# Patient Record
Sex: FEMALE | Race: WHITE | NOT HISPANIC OR LATINO | Employment: OTHER | URBAN - METROPOLITAN AREA
[De-identification: names, ages, dates, MRNs, and addresses within clinical notes are randomized per-mention and may not be internally consistent; named-entity substitution may affect disease eponyms.]

---

## 2017-11-03 NOTE — PATIENT DISCUSSION
Headaches: The trigger initially causes headaches that then become more frequent, eventually occurring almost every day, and difficult to treat. Strategies for treatment and preventing progression include appropriate pain control with medications, avoiding overuse of ibuprofen and acetaminophen, reducing the frequency of headaches with behavioral techniques and preventive medications, and encouraging the adoption of a healthy lifestyle.

## 2017-11-03 NOTE — PATIENT DISCUSSION
HEADACHES WITH NORMAL OPHTHALMOLOGICAL EXAM, OU: RECOMMEND PATIENT FOLLOW UP WITH PRIMARY CARE PHYSICIAN AND NEUROLOGIST NEXT WEEK AS SCHEDULED.

## 2017-11-03 NOTE — PATIENT DISCUSSION
BLEPHARITIS, OU: PRESCRIBE WARM COMPRESSES AND OCUSOFT PLUS EYELID SCRUBS BID. RETURN FOR FOLLOW-UP AS SCHEDULED.

## 2017-11-03 NOTE — PATIENT DISCUSSION
KCS/DRY EYE SYNDROME, OU: PRESCRIBED ARTIFICIAL TEARS BID - QID, OU. RETURN FOR FOLLOW-UP AS SCHEDULED OR SOONER IF SYMPTOMS WORSEN.

## 2017-11-03 NOTE — PATIENT DISCUSSION
Dry Eye Syndrome Counseling: I have discussed the diagnosis and the pathophysiology of this disease with the patient. Eyelid pathology and systemic illnesses such as Sjogren's disease or rheumatoid arthritis may contribute to severity. Vision may be limited by dry eye, and symptoms exacerbated by environmental factors such as smoke, wind, or prolonged eye use. Treatment options include, but are not limited to, artificial tears, punctal plugs, topical cyclosporine, oral omega-3 supplements, Lipiflow, moisture goggles, and lubricating ointments. I stressed the importance of compliance with treatment.

## 2017-11-03 NOTE — PATIENT DISCUSSION
CATARACTS, OU: BECOMING VISUALLY SIGNIFICANT BUT NOT BOTHERSOME TO PATIENT AT THIS TIME. SPEC RX OFFERED. FOLLOW AS SCHEDULED.

## 2017-11-03 NOTE — PATIENT DISCUSSION
GLAUCOMA SUSPECT, OU: ENLARGED OPTIC NERVE CUPPING. STABLE RNFL OCT OU. RETURN FOR FOLLOW UP AS SCHEDULED.

## 2018-08-20 ENCOUNTER — ESTABLISHED COMPREHENSIVE EXAM (OUTPATIENT)
Dept: URBAN - METROPOLITAN AREA CLINIC 43 | Facility: CLINIC | Age: 68
End: 2018-08-20

## 2018-08-20 DIAGNOSIS — E11.9: ICD-10-CM

## 2018-08-20 DIAGNOSIS — H52.4: ICD-10-CM

## 2018-08-20 DIAGNOSIS — H52.13: ICD-10-CM

## 2018-08-20 DIAGNOSIS — H52.203: ICD-10-CM

## 2018-08-20 DIAGNOSIS — H25.813: ICD-10-CM

## 2018-08-20 PROCEDURE — 92014 COMPRE OPH EXAM EST PT 1/>: CPT

## 2018-08-20 PROCEDURE — 92015 DETERMINE REFRACTIVE STATE: CPT

## 2018-08-20 PROCEDURE — 92310-1 LEVEL 1 CONTACT LENS MANAGEMENT

## 2018-08-20 ASSESSMENT — VISUAL ACUITY
OS_CC: J2
OD_SC: CF 4FT
OS_CC: 20/25
OD_CC: J2
OS_SC: J1
OS_BAT: 20/70
OD_CC: 20/25-1
OD_SC: J1
OS_SC: CF 4FT
OD_BAT: 20/40

## 2018-08-20 ASSESSMENT — TONOMETRY
OD_IOP_MMHG: 18
OS_IOP_MMHG: 17

## 2019-06-03 NOTE — PATIENT DISCUSSION
New Prescription: Maxitrol (neomycin-polymyxin-dexameth): ointment: 3.5-10,000-0.1 mg-unit/g-% 1 a small amount at bedtime as directed into both eyes 06-

## 2019-06-03 NOTE — PATIENT DISCUSSION
"Discussed symptoms of pressure behind eyes and around nose. Patient is scheduled to see ENT tomorrow. Recommend keeping this appointment to further address his symptoms. No ocular cause of pressure identified on examination. Also discussed symptoms of ""crawling sensation on left side of scalp x 1 month"" and symptoms of shingles. No signs of herpes zoster on examination today.  If patient's symptoms worsen or if rash appears

## 2019-06-03 NOTE — PATIENT DISCUSSION
KCS/DRY EYE SYNDROME WITH ASSOCIATED MGD, OU: PRESCRIBED MAXITROL QHS OU FOR 2 WEEKS WITH INSTRUCTIONS TO RUB ANY REMAINING CARA INTO EYELASHES, THEN DISCONTINUE AND SWITCH TO REFRESH PM (OR REFRESH GEL IF CARA IS NOT WELL TOLERATED) NIGHTLY. DURING THE DAY, USE REFRESH REPAIR ARTIFICIAL TEARS BID - QID, OU AS NEEDED. PRESCRIBED OTC OCUSOFT LID SCRUBS QD-BID OU. RETURN FOR FOLLOW-UP AS SCHEDULED OR SOONER IF SYMPTOMS WORSEN.

## 2019-08-26 ENCOUNTER — ESTABLISHED COMPREHENSIVE EXAM (OUTPATIENT)
Dept: URBAN - METROPOLITAN AREA CLINIC 43 | Facility: CLINIC | Age: 69
End: 2019-08-26

## 2019-08-26 DIAGNOSIS — H52.4: ICD-10-CM

## 2019-08-26 DIAGNOSIS — H25.813: ICD-10-CM

## 2019-08-26 DIAGNOSIS — H52.13: ICD-10-CM

## 2019-08-26 DIAGNOSIS — E11.9: ICD-10-CM

## 2019-08-26 DIAGNOSIS — H52.203: ICD-10-CM

## 2019-08-26 PROCEDURE — 92015 DETERMINE REFRACTIVE STATE: CPT

## 2019-08-26 PROCEDURE — 92014 COMPRE OPH EXAM EST PT 1/>: CPT

## 2019-08-26 PROCEDURE — 92310-1 LEVEL 1 CONTACT LENS MANAGEMENT

## 2019-08-26 ASSESSMENT — VISUAL ACUITY
OS_SC: CF 6FT
OD_CC: J2
OD_SC: J1
OS_CC: J1
OS_CC: 20/25-1
OS_SC: J1
OD_SC: CF 6FT
OD_CC: 20/30+2

## 2019-08-26 ASSESSMENT — TONOMETRY
OS_IOP_MMHG: 18
OD_IOP_MMHG: 18

## 2020-08-27 ENCOUNTER — ESTABLISHED COMPREHENSIVE EXAM (OUTPATIENT)
Dept: URBAN - METROPOLITAN AREA CLINIC 43 | Facility: CLINIC | Age: 70
End: 2020-08-27

## 2020-08-27 DIAGNOSIS — H52.13: ICD-10-CM

## 2020-08-27 DIAGNOSIS — H25.813: ICD-10-CM

## 2020-08-27 DIAGNOSIS — H52.4: ICD-10-CM

## 2020-08-27 DIAGNOSIS — E11.9: ICD-10-CM

## 2020-08-27 DIAGNOSIS — H52.203: ICD-10-CM

## 2020-08-27 PROCEDURE — 92014 COMPRE OPH EXAM EST PT 1/>: CPT

## 2020-08-27 PROCEDURE — 92310-1 LEVEL 1 CONTACT LENS MANAGEMENT

## 2020-08-27 PROCEDURE — 92015 DETERMINE REFRACTIVE STATE: CPT

## 2020-08-27 ASSESSMENT — VISUAL ACUITY
OD_CC: J2-
OS_CC: 20/25-1+2
OS_SC: CF 6FT
OS_CC: J1-
OS_SC: J1
OD_SC: J1
OD_CC: 20/25+2
OD_SC: CF 6FT

## 2020-08-27 ASSESSMENT — TONOMETRY
OS_IOP_MMHG: 18
OD_IOP_MMHG: 18

## 2021-08-31 ENCOUNTER — ESTABLISHED COMPREHENSIVE EXAM (OUTPATIENT)
Dept: URBAN - METROPOLITAN AREA CLINIC 43 | Facility: CLINIC | Age: 71
End: 2021-08-31

## 2021-08-31 DIAGNOSIS — Z46.0: ICD-10-CM

## 2021-08-31 DIAGNOSIS — E11.9: ICD-10-CM

## 2021-08-31 DIAGNOSIS — H52.13: ICD-10-CM

## 2021-08-31 DIAGNOSIS — H52.4: ICD-10-CM

## 2021-08-31 DIAGNOSIS — H52.203: ICD-10-CM

## 2021-08-31 PROCEDURE — 92015 DETERMINE REFRACTIVE STATE: CPT

## 2021-08-31 PROCEDURE — 92014 COMPRE OPH EXAM EST PT 1/>: CPT

## 2021-08-31 PROCEDURE — 92310-1 LEVEL 1 CONTACT LENS MANAGEMENT

## 2021-08-31 ASSESSMENT — VISUAL ACUITY
OD_CC: J1-
OD_BAT: 20/40
OS_SC: CF 5FT
OD_SC: J1
OS_CC: 20/25+2
OD_CC: 20/20-2
OS_BAT: 20/50-2
OS_CC: J1
OS_SC: J1
OD_SC: CF 5FT

## 2021-08-31 ASSESSMENT — TONOMETRY
OS_IOP_MMHG: 17
OD_IOP_MMHG: 18

## 2021-11-08 NOTE — PATIENT DISCUSSION
Recommended aggressive dry eye treatment. Use Refresh Gel every 2-4 hours while awake. Recommend using Refresh Gel Ointment every night before bed. Shi Salmon

## 2021-11-08 NOTE — PATIENT DISCUSSION
Discontinue Pataday that patient was prescribed. Recommended possible tarsorrhaphy. Refer to Dr. Aminta Johnson.

## 2021-11-30 NOTE — PATIENT DISCUSSION
ECTROPION BILATERAL SURGERY PLAN         Repair of Ectropion,with tarsal strip transfer and rearrange of adjacent eyelid tissue .

## 2021-11-30 NOTE — PATIENT DISCUSSION
Discontinue Pataday that patient was prescribed. Recommended possible tarsorrhaphy. Refer to Dr. Tammi De La Rosa.

## 2021-11-30 NOTE — PATIENT DISCUSSION
I have discussed options with the patient- surgery versus following. The risks, benefits, alternatives include anesthesia, bleeding, infection, inflammation, swelling, bruising. Patient understands and desires to improve lid laxity and irritation. Gave Rx for Erythromycin ointment to be used as directed.

## 2021-11-30 NOTE — PATIENT DISCUSSION
Recommended aggressive dry eye treatment. Use Refresh Gel every 2-4 hours while awake. Recommend using Refresh Gel Ointment every night before bed. Mattie Olmstead

## 2021-11-30 NOTE — PROCEDURE NOTE: CLINICAL
PROCEDURE NOTE: Probing of Lacrimal Canaliculi, With or Without Irrigation Bilateral Lower Lids. Diagnosis: Epiphora Due to Excess Lacrimation. Prior to treatment, the risks/benefits/alternatives were discussed. The patient wished to proceed with procedure. Patient tolerated procedure well. There were no complications. Post-op instructions given. (((fluid flushed with no obstruction)))).

## 2022-01-25 NOTE — PATIENT DISCUSSION
Recommended aggressive dry eye treatment. Use Refresh Gel every 2-4 hours while awake. Recommend using Refresh Gel Ointment every night before bed. Anna Vance

## 2022-01-25 NOTE — PATIENT DISCUSSION
Discontinue Pataday that patient was prescribed. Recommended possible tarsorrhaphy. Refer to Dr. Skip Mckeon.

## 2022-02-22 NOTE — PATIENT DISCUSSION
Discontinue Pataday that patient was prescribed. Recommended possible tarsorrhaphy. Refer to Dr. Luz Almanza.

## 2022-02-22 NOTE — PATIENT DISCUSSION
Recommended aggressive dry eye treatment. Use Refresh Gel every 2-4 hours while awake. Recommend using Refresh Gel Ointment every night before bed. Josh Jones

## 2022-02-22 NOTE — PATIENT DISCUSSION
start artificial tears 4-5 times daily and hold hot compresses on both eyes when wakining in the am to clean lashes of crusting.

## 2022-09-01 ENCOUNTER — COMPREHENSIVE EXAM (OUTPATIENT)
Dept: URBAN - METROPOLITAN AREA CLINIC 43 | Facility: CLINIC | Age: 72
End: 2022-09-01

## 2022-09-01 DIAGNOSIS — H43.813: ICD-10-CM

## 2022-09-01 DIAGNOSIS — E11.9: ICD-10-CM

## 2022-09-01 DIAGNOSIS — Z46.0: ICD-10-CM

## 2022-09-01 DIAGNOSIS — H04.123: ICD-10-CM

## 2022-09-01 DIAGNOSIS — H25.813: ICD-10-CM

## 2022-09-01 PROCEDURE — 92014 COMPRE OPH EXAM EST PT 1/>: CPT

## 2022-09-01 PROCEDURE — 92015 DETERMINE REFRACTIVE STATE: CPT

## 2022-09-01 PROCEDURE — 92310-1 LEVEL 1 CONTACT LENS MANAGEMENT

## 2022-09-01 ASSESSMENT — VISUAL ACUITY
OS_SC: 20/400
OS_SC: J1
OS_CC: 20/20-2
OD_CC: J2-
OD_CC: 20/20-3
OD_SC: 20/400
OD_SC: J1
OS_CC: J2

## 2022-09-01 ASSESSMENT — TONOMETRY
OS_IOP_MMHG: 18
OD_IOP_MMHG: 18

## 2023-07-03 ENCOUNTER — EMERGENCY VISIT (OUTPATIENT)
Dept: URBAN - METROPOLITAN AREA CLINIC 43 | Facility: CLINIC | Age: 73
End: 2023-07-03

## 2023-07-03 DIAGNOSIS — H25.813: ICD-10-CM

## 2023-07-03 DIAGNOSIS — H04.123: ICD-10-CM

## 2023-07-03 DIAGNOSIS — H43.813: ICD-10-CM

## 2023-07-03 DIAGNOSIS — H11.441: ICD-10-CM

## 2023-07-03 DIAGNOSIS — E11.9: ICD-10-CM

## 2023-07-03 DIAGNOSIS — Z46.0: ICD-10-CM

## 2023-07-03 PROCEDURE — 92012 INTRM OPH EXAM EST PATIENT: CPT

## 2023-07-03 ASSESSMENT — VISUAL ACUITY
OS_CC: 20/20
OD_CC: 20/20-2

## 2023-09-07 ENCOUNTER — COMPREHENSIVE EXAM (OUTPATIENT)
Dept: URBAN - METROPOLITAN AREA CLINIC 43 | Facility: CLINIC | Age: 73
End: 2023-09-07

## 2023-09-07 DIAGNOSIS — H11.441: ICD-10-CM

## 2023-09-07 DIAGNOSIS — H04.123: ICD-10-CM

## 2023-09-07 DIAGNOSIS — H43.813: ICD-10-CM

## 2023-09-07 DIAGNOSIS — E11.9: ICD-10-CM

## 2023-09-07 DIAGNOSIS — H25.813: ICD-10-CM

## 2023-09-07 DIAGNOSIS — Z46.0: ICD-10-CM

## 2023-09-07 PROCEDURE — 92015 DETERMINE REFRACTIVE STATE: CPT

## 2023-09-07 PROCEDURE — 92014 COMPRE OPH EXAM EST PT 1/>: CPT

## 2023-09-07 PROCEDURE — 92310-2 LEVEL 2 CONTACT LENS MANAGEMENT

## 2023-09-07 ASSESSMENT — TONOMETRY
OD_IOP_MMHG: 19
OS_IOP_MMHG: 17

## 2023-09-07 ASSESSMENT — VISUAL ACUITY
OS_SC: J1
OS_CC: J1
OD_CC: 20/25-1
OS_CC: 20/20
OD_SC: J1
OD_CC: J1
OD_SC: 20/400
OS_SC: 20/400

## 2024-09-12 ENCOUNTER — COMPREHENSIVE EXAM (OUTPATIENT)
Dept: URBAN - METROPOLITAN AREA CLINIC 43 | Facility: CLINIC | Age: 74
End: 2024-09-12

## 2024-09-12 DIAGNOSIS — E11.9: ICD-10-CM

## 2024-09-12 DIAGNOSIS — H04.123: ICD-10-CM

## 2024-09-12 DIAGNOSIS — H25.813: ICD-10-CM

## 2024-09-12 DIAGNOSIS — Z46.0: ICD-10-CM

## 2024-09-12 DIAGNOSIS — H11.441: ICD-10-CM

## 2024-09-12 DIAGNOSIS — H43.813: ICD-10-CM

## 2024-09-12 PROCEDURE — 92015 DETERMINE REFRACTIVE STATE: CPT

## 2024-09-12 PROCEDURE — 92014 COMPRE OPH EXAM EST PT 1/>: CPT

## 2024-09-12 PROCEDURE — 92310-1 LEVEL 1 CONTACT LENS MANAGEMENT
